# Patient Record
Sex: FEMALE | Race: WHITE | NOT HISPANIC OR LATINO | URBAN - METROPOLITAN AREA
[De-identification: names, ages, dates, MRNs, and addresses within clinical notes are randomized per-mention and may not be internally consistent; named-entity substitution may affect disease eponyms.]

---

## 2017-03-16 ENCOUNTER — OUTPATIENT (OUTPATIENT)
Dept: OUTPATIENT SERVICES | Facility: HOSPITAL | Age: 37
LOS: 1 days | End: 2017-03-16
Payer: COMMERCIAL

## 2017-03-16 PROCEDURE — 76815 OB US LIMITED FETUS(S): CPT | Mod: 26

## 2017-09-19 ENCOUNTER — OUTPATIENT (OUTPATIENT)
Dept: OUTPATIENT SERVICES | Facility: HOSPITAL | Age: 37
LOS: 1 days | End: 2017-09-19

## 2017-09-20 ENCOUNTER — INPATIENT (INPATIENT)
Facility: HOSPITAL | Age: 37
LOS: 3 days | Discharge: HOME | End: 2017-09-24

## 2018-03-14 PROBLEM — Z00.00 ENCOUNTER FOR PREVENTIVE HEALTH EXAMINATION: Status: ACTIVE | Noted: 2018-03-14

## 2018-04-18 ENCOUNTER — RX RENEWAL (OUTPATIENT)
Age: 38
End: 2018-04-18

## 2018-05-01 ENCOUNTER — APPOINTMENT (OUTPATIENT)
Dept: OBGYN | Facility: CLINIC | Age: 38
End: 2018-05-01
Payer: COMMERCIAL

## 2018-05-01 VITALS
SYSTOLIC BLOOD PRESSURE: 105 MMHG | DIASTOLIC BLOOD PRESSURE: 80 MMHG | HEIGHT: 63 IN | BODY MASS INDEX: 20.02 KG/M2 | WEIGHT: 113 LBS

## 2018-05-01 DIAGNOSIS — Z78.9 OTHER SPECIFIED HEALTH STATUS: ICD-10-CM

## 2018-05-01 PROCEDURE — 99213 OFFICE O/P EST LOW 20 MIN: CPT

## 2018-05-30 ENCOUNTER — RX RENEWAL (OUTPATIENT)
Age: 38
End: 2018-05-30

## 2018-11-07 ENCOUNTER — RX RENEWAL (OUTPATIENT)
Age: 38
End: 2018-11-07

## 2018-12-04 ENCOUNTER — APPOINTMENT (OUTPATIENT)
Dept: OBGYN | Facility: CLINIC | Age: 38
End: 2018-12-04

## 2018-12-24 ENCOUNTER — RX RENEWAL (OUTPATIENT)
Age: 38
End: 2018-12-24

## 2019-01-22 ENCOUNTER — APPOINTMENT (OUTPATIENT)
Dept: OBGYN | Facility: CLINIC | Age: 39
End: 2019-01-22

## 2019-03-13 ENCOUNTER — APPOINTMENT (OUTPATIENT)
Dept: OBGYN | Facility: CLINIC | Age: 39
End: 2019-03-13
Payer: COMMERCIAL

## 2019-03-13 VITALS
HEIGHT: 63 IN | BODY MASS INDEX: 21.26 KG/M2 | SYSTOLIC BLOOD PRESSURE: 90 MMHG | WEIGHT: 120 LBS | DIASTOLIC BLOOD PRESSURE: 60 MMHG

## 2019-03-13 DIAGNOSIS — N95.1 MENOPAUSAL AND FEMALE CLIMACTERIC STATES: ICD-10-CM

## 2019-03-13 PROCEDURE — 99395 PREV VISIT EST AGE 18-39: CPT

## 2019-03-13 NOTE — DISCUSSION/SUMMARY
[FreeTextEntry1] : a38 years old female here for annual exam ,physical and pelvic exam ok ,pap done ,\par sent for mamgram i spent 25 minutes with the patient

## 2019-03-15 LAB — HPV HIGH+LOW RISK DNA PNL CVX: NOT DETECTED

## 2019-03-18 LAB — CYTOLOGY CVX/VAG DOC THIN PREP: NORMAL

## 2019-03-22 ENCOUNTER — OUTPATIENT (OUTPATIENT)
Dept: OUTPATIENT SERVICES | Facility: HOSPITAL | Age: 39
LOS: 1 days | End: 2019-03-22

## 2019-10-15 ENCOUNTER — RX RENEWAL (OUTPATIENT)
Age: 39
End: 2019-10-15

## 2020-07-28 ENCOUNTER — APPOINTMENT (OUTPATIENT)
Dept: OBGYN | Facility: CLINIC | Age: 40
End: 2020-07-28
Payer: COMMERCIAL

## 2020-07-28 VITALS
HEIGHT: 63 IN | SYSTOLIC BLOOD PRESSURE: 100 MMHG | BODY MASS INDEX: 20.91 KG/M2 | DIASTOLIC BLOOD PRESSURE: 66 MMHG | TEMPERATURE: 97.5 F | WEIGHT: 118 LBS

## 2020-07-28 DIAGNOSIS — Z82.49 FAMILY HISTORY OF ISCHEMIC HEART DISEASE AND OTHER DISEASES OF THE CIRCULATORY SYSTEM: ICD-10-CM

## 2020-07-28 PROCEDURE — 99395 PREV VISIT EST AGE 18-39: CPT

## 2020-07-28 RX ORDER — NORETHINDRONE ACETATE AND ETHINYL ESTRADIOL AND FERROUS FUMARATE 1MG-20(21)
1-20 KIT ORAL
Qty: 84 | Refills: 1 | Status: DISCONTINUED | COMMUNITY
Start: 2019-05-08 | End: 2020-07-28

## 2020-07-28 RX ORDER — MULTIVITAMIN
TABLET ORAL
Refills: 0 | Status: ACTIVE | COMMUNITY

## 2020-07-28 RX ORDER — NORETHINDRONE ACETATE AND ETHINYL ESTRADIOL AND FERROUS FUMARATE 1MG-20(21)
1-20 KIT ORAL
Qty: 28 | Refills: 2 | Status: DISCONTINUED | COMMUNITY
Start: 2019-05-08 | End: 2020-07-28

## 2020-07-28 RX ORDER — NORETHINDRONE ACETATE AND ETHINYL ESTRADIOL AND FERROUS FUMARATE 1MG-20(21)
1-20 KIT ORAL
Qty: 84 | Refills: 0 | Status: DISCONTINUED | COMMUNITY
Start: 2018-11-07 | End: 2020-07-28

## 2020-07-28 RX ORDER — NORETHINDRONE 0.35 MG/1
0.35 TABLET ORAL DAILY
Qty: 90 | Refills: 1 | Status: DISCONTINUED | COMMUNITY
Start: 2018-04-18 | End: 2020-07-28

## 2020-07-28 RX ORDER — NORETHINDRONE ACETATE AND ETHINYL ESTRADIOL 1MG-20(21)
1-20 KIT ORAL DAILY
Qty: 90 | Refills: 1 | Status: DISCONTINUED | COMMUNITY
Start: 2018-05-30 | End: 2020-07-28
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
## 2020-07-28 NOTE — PHYSICAL EXAM
[Awake] : awake [Alert] : alert [Acute Distress] : no acute distress [Mass] : no breast mass [Nipple Discharge] : no nipple discharge [Axillary LAD] : no axillary lymphadenopathy [Soft] : soft [Tender] : non tender [Oriented x3] : oriented to person, place, and time [Normal] : uterus [No Bleeding] : there was no active vaginal bleeding [Uterine Adnexae] : were not tender and not enlarged [RRR, No Murmurs] : RRR, no murmurs [CTAB] : CTAB [FreeTextEntry5] : deviated to pt left

## 2020-07-29 LAB — HPV HIGH+LOW RISK DNA PNL CVX: NOT DETECTED

## 2020-07-31 LAB — CYTOLOGY CVX/VAG DOC THIN PREP: NORMAL

## 2020-12-23 ENCOUNTER — TRANSCRIPTION ENCOUNTER (OUTPATIENT)
Age: 40
End: 2020-12-23

## 2020-12-27 ENCOUNTER — TRANSCRIPTION ENCOUNTER (OUTPATIENT)
Age: 40
End: 2020-12-27

## 2021-11-09 ENCOUNTER — APPOINTMENT (OUTPATIENT)
Dept: MAMMOGRAPHY | Facility: CLINIC | Age: 41
End: 2021-11-09
Payer: COMMERCIAL

## 2021-11-09 PROCEDURE — 77067 SCR MAMMO BI INCL CAD: CPT

## 2021-11-09 PROCEDURE — 77063 BREAST TOMOSYNTHESIS BI: CPT

## 2021-11-24 ENCOUNTER — TRANSCRIPTION ENCOUNTER (OUTPATIENT)
Age: 41
End: 2021-11-24

## 2022-11-03 ENCOUNTER — OFFICE (OUTPATIENT)
Dept: URBAN - METROPOLITAN AREA CLINIC 97 | Facility: CLINIC | Age: 42
Setting detail: OPHTHALMOLOGY
End: 2022-11-03
Payer: COMMERCIAL

## 2022-11-03 DIAGNOSIS — H35.413: ICD-10-CM

## 2022-11-03 PROCEDURE — 92014 COMPRE OPH EXAM EST PT 1/>: CPT | Performed by: OPHTHALMOLOGY

## 2022-11-03 ASSESSMENT — KERATOMETRY
OS_AXISANGLE_DEGREES: 083
OD_AXISANGLE_DEGREES: 095
OD_K1POWER_DIOPTERS: 41.25
OS_K2POWER_DIOPTERS: 43.25
OD_K2POWER_DIOPTERS: 42.00
OS_K1POWER_DIOPTERS: 41.75

## 2022-11-03 ASSESSMENT — REFRACTION_AUTOREFRACTION
OD_CYLINDER: +1.75
OD_AXIS: 098
OD_SPHERE: -2.00
OS_CYLINDER: +0.75
OS_SPHERE: -0.50
OS_AXIS: 122

## 2022-11-03 ASSESSMENT — CONFRONTATIONAL VISUAL FIELD TEST (CVF)
OD_FINDINGS: FULL
OS_FINDINGS: FULL

## 2022-11-03 ASSESSMENT — SPHEQUIV_DERIVED
OD_SPHEQUIV: -1.125
OS_SPHEQUIV: 0
OD_SPHEQUIV: 0
OS_SPHEQUIV: -0.125

## 2022-11-03 ASSESSMENT — REFRACTION_MANIFEST
OD_AXIS: 100
OS_SPHERE: -0.25
OS_CYLINDER: +0.50
OD_CYLINDER: +0.50
OS_VA1: 20/20
OS_AXIS: 120
OD_SPHERE: -0.25
OU_VA: 20/20
OD_VA1: 20/20

## 2022-11-03 ASSESSMENT — AXIALLENGTH_DERIVED
OD_AL: 24.3013
OS_AL: 23.965
OD_AL: 24.7755
OS_AL: 24.0154

## 2022-11-03 ASSESSMENT — VISUAL ACUITY
OS_BCVA: 20/20-2
OD_BCVA: 20/20-

## 2023-03-16 ENCOUNTER — APPOINTMENT (OUTPATIENT)
Dept: MAMMOGRAPHY | Facility: CLINIC | Age: 43
End: 2023-03-16
Payer: COMMERCIAL

## 2023-03-16 ENCOUNTER — APPOINTMENT (OUTPATIENT)
Dept: ULTRASOUND IMAGING | Facility: CLINIC | Age: 43
End: 2023-03-16
Payer: COMMERCIAL

## 2023-03-16 PROCEDURE — 77067 SCR MAMMO BI INCL CAD: CPT

## 2023-03-16 PROCEDURE — 77063 BREAST TOMOSYNTHESIS BI: CPT

## 2023-03-16 PROCEDURE — 76641 ULTRASOUND BREAST COMPLETE: CPT | Mod: 50

## 2024-01-22 ENCOUNTER — OFFICE (OUTPATIENT)
Dept: URBAN - METROPOLITAN AREA CLINIC 97 | Facility: CLINIC | Age: 44
Setting detail: OPHTHALMOLOGY
End: 2024-01-22
Payer: COMMERCIAL

## 2024-01-22 DIAGNOSIS — D31.02: ICD-10-CM

## 2024-01-22 DIAGNOSIS — H35.413: ICD-10-CM

## 2024-01-22 DIAGNOSIS — D31.01: ICD-10-CM

## 2024-01-22 PROCEDURE — 92014 COMPRE OPH EXAM EST PT 1/>: CPT | Performed by: OPHTHALMOLOGY

## 2024-01-22 ASSESSMENT — REFRACTION_AUTOREFRACTION
OS_CYLINDER: +0.25
OS_AXIS: 146
OD_CYLINDER: +1.50
OD_SPHERE: -1.75
OD_AXIS: 095
OS_SPHERE: -0.25

## 2024-01-22 ASSESSMENT — REFRACTION_MANIFEST
OS_AXIS: 150
OD_VA1: 20/20
OS_SPHERE: -0.25
OD_SPHERE: -0.25
OD_AXIS: 095
OD_CYLINDER: +0.50
OU_VA: 20/20
OS_CYLINDER: +0.50
OS_VA1: 20/20

## 2024-01-22 ASSESSMENT — CONFRONTATIONAL VISUAL FIELD TEST (CVF)
OS_FINDINGS: FULL
OD_FINDINGS: FULL

## 2024-01-22 ASSESSMENT — SPHEQUIV_DERIVED
OD_SPHEQUIV: -1
OS_SPHEQUIV: 0
OD_SPHEQUIV: 0
OS_SPHEQUIV: -0.125

## 2024-03-19 ENCOUNTER — APPOINTMENT (OUTPATIENT)
Dept: MAMMOGRAPHY | Facility: CLINIC | Age: 44
End: 2024-03-19
Payer: COMMERCIAL

## 2024-03-19 PROCEDURE — 77067 SCR MAMMO BI INCL CAD: CPT

## 2024-03-19 PROCEDURE — 77063 BREAST TOMOSYNTHESIS BI: CPT

## 2025-02-11 ENCOUNTER — OFFICE (OUTPATIENT)
Dept: URBAN - METROPOLITAN AREA CLINIC 38 | Facility: CLINIC | Age: 45
Setting detail: OPHTHALMOLOGY
End: 2025-02-11
Payer: COMMERCIAL

## 2025-02-11 DIAGNOSIS — H40.013: ICD-10-CM

## 2025-02-11 DIAGNOSIS — D31.02: ICD-10-CM

## 2025-02-11 DIAGNOSIS — H35.413: ICD-10-CM

## 2025-02-11 DIAGNOSIS — D31.01: ICD-10-CM

## 2025-02-11 PROCEDURE — 92014 COMPRE OPH EXAM EST PT 1/>: CPT | Performed by: OPHTHALMOLOGY

## 2025-02-11 PROCEDURE — 92133 CPTRZD OPH DX IMG PST SGM ON: CPT | Performed by: OPHTHALMOLOGY

## 2025-02-11 ASSESSMENT — REFRACTION_AUTOREFRACTION
OD_AXIS: 006
OS_CYLINDER: -0.25
OD_CYLINDER: -1.50
OS_SPHERE: PLANO
OD_SPHERE: -1.00
OS_AXIS: 071

## 2025-02-11 ASSESSMENT — REFRACTION_MANIFEST
OS_SPHERE: PLANO
OD_SPHERE: PLANO
OS_VA1: 20/20
OD_CYLINDER: SPH
OD_VA1: 20/20
OS_CYLINDER: SPH
OU_VA: 20/20

## 2025-02-11 ASSESSMENT — KERATOMETRY
OS_K2POWER_DIOPTERS: 39.75
OD_K1POWER_DIOPTERS: 41.50
OD_K2POWER_DIOPTERS: 42.00
OD_AXISANGLE_DEGREES: 104
OS_K1POWER_DIOPTERS: 39.00
OS_AXISANGLE_DEGREES: 179

## 2025-02-11 ASSESSMENT — VISUAL ACUITY
OS_BCVA: 20/20
OD_BCVA: 20/20

## 2025-02-11 ASSESSMENT — CONFRONTATIONAL VISUAL FIELD TEST (CVF)
OS_FINDINGS: FULL
OD_FINDINGS: FULL

## 2025-04-09 ENCOUNTER — APPOINTMENT (OUTPATIENT)
Dept: ORTHOPEDIC SURGERY | Facility: CLINIC | Age: 45
End: 2025-04-09

## 2025-05-20 ENCOUNTER — APPOINTMENT (OUTPATIENT)
Dept: MAMMOGRAPHY | Facility: CLINIC | Age: 45
End: 2025-05-20
Payer: COMMERCIAL

## 2025-05-20 PROCEDURE — 77063 BREAST TOMOSYNTHESIS BI: CPT

## 2025-05-20 PROCEDURE — 77067 SCR MAMMO BI INCL CAD: CPT
